# Patient Record
Sex: MALE | Race: WHITE | URBAN - METROPOLITAN AREA
[De-identification: names, ages, dates, MRNs, and addresses within clinical notes are randomized per-mention and may not be internally consistent; named-entity substitution may affect disease eponyms.]

---

## 2023-08-04 ENCOUNTER — TELEPHONE (OUTPATIENT)
Dept: SURGERY | Age: 63
End: 2023-08-04

## 2023-08-04 NOTE — TELEPHONE ENCOUNTER
Patient l/v/m inquiring about scheduling a Colonoscopy w/ [facility]. Returned patient's call; L/V/M. Patient will need to speak w/ the Referral Coordinator.

## 2023-10-05 NOTE — PROGRESS NOTES
Name: Mathilda Schwab      MRN: [id number]       : 1960       Age: 58 y.o. Sex: male        Gina Chan MD       CC:  No chief complaint on file. HPI:  The patient is referred here for a 10 year follow up colonoscopy by Dr. Blas Jones  I know the patient from a laparoscopic bilateral inguinal hernia repair with an open umbilical hernia repair done on 12. No recent abdominal pain, nausea or vomiting. Family hx of colon cancer No      Previous colonoscopy Yes   BRBPR No   Melena No   Loss of appetite No   Weight loss No      Change in bowel habits No       HISTORY:    No past medical history on file. Past Surgical History:   Procedure Laterality Date    COLONOSCOPY  2012    due date 2022     Prior to Admission medications    Medication Sig Start Date End Date Taking? Authorizing Provider   lansoprazole (PREVACID) 15 MG delayed release capsule Take by mouth every morning (before breakfast)    Automatic Reconciliation, Ar     Social History     Tobacco Use    Smoking status: Never    Smokeless tobacco: Never   Substance Use Topics    Alcohol use: Yes     Alcohol/week: 2.5 standard drinks of alcohol    Drug use: No     Family History   Problem Relation Age of Onset    Post-op Cognitive Dysfunction Neg Hx     Delayed Awakening Neg Hx     Post-op Nausea/Vomiting Neg Hx     Other Neg Hx     Emergence Delirium Neg Hx     Malig Hypertherm Neg Hx     Pseudochol. Deficiency Neg Hx      . Allergies   Allergen Reactions    Penicillins Rash       Physical Exam:     There were no vitals taken for this visit. General: Alert, oriented, cooperative white male in no acute distress. Resp: Breathing is  non-labored. Lungs clear to auscultation without wheezing or rhonchi   CV: RRR. No murmurs, rubs or gallops appreciated. Abd: soft non-tender and non-distended without peritoneal signs. +bs    Extremities: No clubbing, cyanosis or edema. Strength intact.       Assessment/Plan:

## 2023-10-10 ENCOUNTER — OFFICE VISIT (OUTPATIENT)
Dept: SURGERY | Age: 63
End: 2023-10-10

## 2023-10-10 ENCOUNTER — PREP FOR PROCEDURE (OUTPATIENT)
Dept: SURGERY | Age: 63
End: 2023-10-10

## 2023-10-10 VITALS
DIASTOLIC BLOOD PRESSURE: 91 MMHG | WEIGHT: 206.1 LBS | BODY MASS INDEX: 28.85 KG/M2 | HEART RATE: 75 BPM | SYSTOLIC BLOOD PRESSURE: 144 MMHG | HEIGHT: 71 IN

## 2023-10-10 DIAGNOSIS — Z12.11 ENCOUNTER FOR SCREENING COLONOSCOPY: Primary | ICD-10-CM

## 2023-10-10 DIAGNOSIS — Z12.11 ENCOUNTER FOR SCREENING COLONOSCOPY: ICD-10-CM

## 2023-10-10 RX ORDER — ZOLPIDEM TARTRATE 10 MG/1
10 TABLET ORAL NIGHTLY PRN
COMMUNITY

## 2023-10-10 RX ORDER — MELOXICAM 15 MG/1
15 TABLET ORAL DAILY
COMMUNITY

## 2023-10-10 RX ORDER — CITALOPRAM 20 MG/1
20 TABLET ORAL DAILY
COMMUNITY
Start: 2023-05-04

## 2023-11-09 PROBLEM — Z12.11 ENCOUNTER FOR SCREENING COLONOSCOPY: Status: RESOLVED | Noted: 2023-10-10 | Resolved: 2023-11-09

## 2023-12-04 PROBLEM — Z12.11 ENCOUNTER FOR SCREENING COLONOSCOPY: Status: ACTIVE | Noted: 2023-10-10

## 2024-01-04 PROBLEM — Z12.11 ENCOUNTER FOR SCREENING COLONOSCOPY: Status: ACTIVE | Noted: 2023-10-10

## 2024-01-09 ENCOUNTER — TELEPHONE (OUTPATIENT)
Dept: SURGERY | Age: 64
End: 2024-01-09

## 2024-01-09 NOTE — TELEPHONE ENCOUNTER
Received VM from patient & Pre-Assessment Nurse regarding RLE wound s/p I&D on 11/7/2023. Called and spoke w/ patient regarding I&D wound; patient stated that his wound is healing and is almost closed. Patient also stated that he is currently taking an antibiotic [Azithromycin] for a cold. Patient denies any fever, chills, nausea & vomiting or drainage and would like to proceed w/ the Colonoscopy on 1/15/24. Informed the patient that I will send a message to Dr. Hines to verify.  Per Dr. Hines; okay to proceed w/ scheduled Colonoscopy on Monday, 1/15/24. Informed patient & Pre-Assessment Nurse. Patient confirmed & verbalized understanding.

## 2024-01-10 ENCOUNTER — TELEPHONE (OUTPATIENT)
Dept: SURGERY | Age: 64
End: 2024-01-10

## 2024-01-10 NOTE — TELEPHONE ENCOUNTER
Called patient to go over prep for colo on 1/15/24.  He states he is waiting on a call back from [facility] in regards to keep colonoscopy scheduled.  Will wait and see if Donny wants to keep it on